# Patient Record
Sex: MALE | Employment: OTHER | URBAN - METROPOLITAN AREA
[De-identification: names, ages, dates, MRNs, and addresses within clinical notes are randomized per-mention and may not be internally consistent; named-entity substitution may affect disease eponyms.]

---

## 2020-06-15 ENCOUNTER — TELEPHONE (OUTPATIENT)
Dept: UROLOGY | Facility: CLINIC | Age: 70
End: 2020-06-15

## 2020-06-24 ENCOUNTER — TRANSCRIBE ORDERS (OUTPATIENT)
Dept: OTHER | Age: 70
End: 2020-06-24

## 2020-06-24 DIAGNOSIS — N32.0 BLADDER NECK CONTRACTURE: Primary | ICD-10-CM

## 2020-06-25 ENCOUNTER — TELEPHONE (OUTPATIENT)
Dept: UROLOGY | Facility: CLINIC | Age: 70
End: 2020-06-25

## 2020-07-08 ENCOUNTER — VIRTUAL VISIT (OUTPATIENT)
Dept: UROLOGY | Facility: CLINIC | Age: 70
End: 2020-07-08
Attending: INTERNAL MEDICINE
Payer: COMMERCIAL

## 2020-07-08 VITALS — BODY MASS INDEX: 31.44 KG/M2 | HEIGHT: 74 IN | WEIGHT: 245 LBS

## 2020-07-08 DIAGNOSIS — C61 PROSTATE CANCER (H): Primary | ICD-10-CM

## 2020-07-08 PROCEDURE — 99202 OFFICE O/P NEW SF 15 MIN: CPT | Mod: 95 | Performed by: UROLOGY

## 2020-07-08 RX ORDER — MIDODRINE HYDROCHLORIDE 5 MG/1
TABLET ORAL
COMMUNITY

## 2020-07-08 RX ORDER — METOPROLOL TARTRATE 25 MG/1
50 TABLET, FILM COATED ORAL 2 TIMES DAILY
COMMUNITY

## 2020-07-08 RX ORDER — FINASTERIDE 5 MG/1
5 TABLET, FILM COATED ORAL DAILY
Qty: 90 TABLET | Refills: 4 | Status: SHIPPED | OUTPATIENT
Start: 2020-07-08

## 2020-07-08 RX ORDER — SWAB
1 SWAB, NON-MEDICATED MISCELLANEOUS DAILY
COMMUNITY

## 2020-07-08 RX ORDER — ROSUVASTATIN CALCIUM 10 MG/1
5 TABLET, COATED ORAL DAILY
COMMUNITY

## 2020-07-08 ASSESSMENT — MIFFLIN-ST. JEOR: SCORE: 1956.06

## 2020-07-08 ASSESSMENT — PAIN SCALES - GENERAL: PAINLEVEL: NO PAIN (0)

## 2020-07-08 NOTE — PROGRESS NOTES
"Esvin Nava is a 69 year old male who is being evaluated via a billable telephone visit.      The patient has been notified of following:     \"This telephone visit will be conducted via a call between you and your physician/provider. We have found that certain health care needs can be provided without the need for a physical exam.  This service lets us provide the care you need with a short phone conversation.  If a prescription is necessary we can send it directly to your pharmacy.  If lab work is needed we can place an order for that and you can then stop by our lab to have the test done at a later time.    Telephone visits are billed at different rates depending on your insurance coverage. During this emergency period, for some insurers they may be billed the same as an in-person visit.  Please reach out to your insurance provider with any questions.    If during the course of the call the physician/provider feels a telephone visit is not appropriate, you will not be charged for this service.\"    Patient has given verbal consent for Telephone visit?  Yes    What phone number would you like to be contacted at? [phone]    How would you like to obtain your AVS? Mail a copy     It is a pleasure to have the opportunity to have a telephone consultation with this pleasant 69-year-old gentleman.  He, earlier this year had problems with gross hematuria and received treatment in [facility] which involved fulguration of bleeders in the bladder.  He had a radical prostatectomy in 2008 followed by radiation therapy for local recurrence.  He is currently incontinent of urine but seems quite content.  The bleeding has been stopped now for about 8 weeks.  He is rather concerned about the possibility of recurrence of bleeding.  He is otherwise in good health.  All his anticoagulant medications have been discontinued and he also has a history of atrial fibrillation.  Past Medical History:   Diagnosis Date     " Mumps      Palpitations      Prostate infection      Past Surgical History:   Procedure Laterality Date     BLADDER SURGERY       CYSTOSCOPY       PROSTATE SURGERY       VASECTOMY      approx 1992       Current Outpatient Medications:      finasteride (PROSCAR) 5 MG tablet, Take 1 tablet (5 mg) by mouth daily, Disp: 90 tablet, Rfl: 4     metoprolol tartrate (LOPRESSOR) 25 MG tablet, Take 50 mg by mouth 2 times daily , Disp: , Rfl:      midodrine (PROAMATINE) 5 MG tablet, midodrine 5 mg tablet  Take 1 tablet twice a day by oral route., Disp: , Rfl:      rosuvastatin (CRESTOR) 10 MG tablet, Take 5 mg by mouth daily , Disp: , Rfl:      vitamin D3 (CHOLECALCIFEROL) 10 MCG (400 UNIT) capsule, Take 1 capsule by mouth daily, Disp: , Rfl:      Pleasant gentleman who does not sound in distress and is otherwise quite conversational and well oriented.    Mental status.  Normal.    Impression.  I do not have any records but did obtain a very good history from the patient.  He clearly has radiation cystitis with some of the quite marked vascular effects that can occur many years after radiation therapy.  His main concern is that of the possibility of rebleeding.  I do not see an indication for further cystoscopy at the present time in the absence of bleeding as this may cause bleeding.  However I do suggest he start finasteride 5 mg daily as this has proven quite effective in reducing vascularity in patients with radiation cystitis.  I have sent a prescription into his pharmacy.  We also had a discussion about problems he is having with incontinence.  He wears a pad at the moment over time.  I did familiarize him with other options including the use of a Cunningham clamp, the use of a condom catheter and even an artificial sphincter.  He however seems quite content with the current situation and does not wish to pursue these other options.  I recommended he follow-up closely with his personal physician and that if further  bleeding occurs, he will be seen by a urologist closer to where he lives i.e. in [place]  We discussed the situation carefully.  I answered all his questions    Phone call duration: 22 minutes    Angel Solitario MD

## 2020-07-08 NOTE — NURSING NOTE
Chief Complaint   Patient presents with     Hematuria     Incontinence     since surgery in January   Trice Lau LPN

## 2020-07-14 ENCOUNTER — TELEPHONE (OUTPATIENT)
Dept: UROLOGY | Facility: CLINIC | Age: 70
End: 2020-07-14

## 2020-07-14 DIAGNOSIS — N30.41 HEMATURIA DUE TO IRRADIATION CYSTITIS: ICD-10-CM

## 2020-07-14 DIAGNOSIS — N30.40 RADIATION CYSTITIS: Primary | ICD-10-CM

## 2020-07-14 RX ORDER — FINASTERIDE 5 MG/1
5 TABLET, FILM COATED ORAL DAILY
Qty: 90 TABLET | Refills: 4 | Status: SHIPPED | OUTPATIENT
Start: 2020-07-14

## 2020-07-14 NOTE — TELEPHONE ENCOUNTER
Signed Rx for finasteride faxed to [facility] as below.  TORITO Ordonez RN      [facility] Center    Phone Message    May a detailed message be left on voicemail: no     Reason for Call: Medication Question or concern regarding medication   Prescription Clarification  Name of Medication: finasteride (PROSCAR) 5 MG tablet   Prescribing Provider: Dr. Solitario   Pharmacy: [facility]   What on the order needs clarification? Pt wants the prescription sent to the [facility], fax # [phone]. He says the copay at the current pharmacy it was sent to is too expensive. [facility] more [facility].           Action Taken: Message routed to:  [facility] ([facility]):  CLINICAL POOL    Travel Screening: Not Applicable